# Patient Record
Sex: MALE | Race: WHITE | NOT HISPANIC OR LATINO
[De-identification: names, ages, dates, MRNs, and addresses within clinical notes are randomized per-mention and may not be internally consistent; named-entity substitution may affect disease eponyms.]

---

## 2018-02-24 ENCOUNTER — TRANSCRIPTION ENCOUNTER (OUTPATIENT)
Age: 53
End: 2018-02-24

## 2018-02-27 ENCOUNTER — TRANSCRIPTION ENCOUNTER (OUTPATIENT)
Age: 53
End: 2018-02-27

## 2020-10-20 ENCOUNTER — EMERGENCY (EMERGENCY)
Facility: HOSPITAL | Age: 55
LOS: 0 days | Discharge: HOME | End: 2020-10-20
Attending: EMERGENCY MEDICINE | Admitting: EMERGENCY MEDICINE
Payer: OTHER MISCELLANEOUS

## 2020-10-20 VITALS
HEART RATE: 65 BPM | SYSTOLIC BLOOD PRESSURE: 134 MMHG | OXYGEN SATURATION: 100 % | RESPIRATION RATE: 18 BRPM | DIASTOLIC BLOOD PRESSURE: 81 MMHG | HEIGHT: 74 IN | WEIGHT: 214.95 LBS | TEMPERATURE: 98 F
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
## 2020-10-20 DIAGNOSIS — S61.001A UNSPECIFIED OPEN WOUND OF RIGHT THUMB WITHOUT DAMAGE TO NAIL, INITIAL ENCOUNTER: ICD-10-CM

## 2020-10-20 DIAGNOSIS — Z23 ENCOUNTER FOR IMMUNIZATION: ICD-10-CM
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
## 2020-10-20 PROCEDURE — 64450 NJX AA&/STRD OTHER PN/BRANCH: CPT

## 2020-10-20 PROCEDURE — 99283 EMERGENCY DEPT VISIT LOW MDM: CPT | Mod: 25

## 2020-10-20 RX ORDER — TETANUS TOXOID, REDUCED DIPHTHERIA TOXOID AND ACELLULAR PERTUSSIS VACCINE, ADSORBED 5; 2.5; 8; 8; 2.5 [IU]/.5ML; [IU]/.5ML; UG/.5ML; UG/.5ML; UG/.5ML
0.5 SUSPENSION INTRAMUSCULAR ONCE
Refills: 0 | Status: COMPLETED | OUTPATIENT
Start: 2020-10-20 | End: 2020-10-20

## 2020-10-20 RX ADMIN — TETANUS TOXOID, REDUCED DIPHTHERIA TOXOID AND ACELLULAR PERTUSSIS VACCINE, ADSORBED 0.5 MILLILITER(S): 5; 2.5; 8; 8; 2.5 SUSPENSION INTRAMUSCULAR at 10:19

## 2020-10-20 NOTE — ED PROVIDER NOTE - NS ED ROS FT
Constitutional: No fevers.   Eyes:  No visual changes, eye pain or discharge.  ENMT:  No sore throat or runny nose.  MS:  no joint injury.   Neuro:  No numbness/tingling.   Skin:  avulsion to the left thumb.   Endocrine: No history of thyroid disease or diabetes.

## 2020-10-20 NOTE — ED PROVIDER NOTE - PROGRESS NOTE DETAILS
ATTENDING NOTE: I personally evaluated the patient. I reviewed the Resident’s or Physician Assistant’s note (as assigned above), and agree with the findings and plan except as documented in my note. 54 y/o M presents to the ED s/p cut to the tip of chis L thumb while opening a can. (Please note, triage note states R thumb). Tetanus UTD. On exam: Skin avulsion to tip of L thumb. Bleeding controlled with pressure. Hemostasis achieved with quick clot. Hemostasis controlled with TXA and hemostatic dressing. Return precautions discussed; signs of infection discussed as well. -DC ATTENDING NOTE: I personally evaluated the patient. I reviewed the Resident’s or Physician Assistant’s note (as assigned above), and agree with the findings and plan except as documented in my note. 56 y/o M presents to the ED s/p cut to the tip of his L thumb while opening a can. (Please note, triage note states R thumb). Tetanus UTD. On exam: Skin avulsion to tip of L thumb. Bleeding controlled with pressure. Hemostasis achieved with quick clot.

## 2020-10-20 NOTE — ED ADULT NURSE NOTE - NSIMPLEMENTINTERV_GEN_ALL_ED
Implemented All Universal Safety Interventions:  [place] to call system. Call bell, personal items and telephone within reach. Instruct patient to call for assistance. Room bathroom lighting operational. Non-slip footwear when patient is off stretcher. Physically safe environment: no spills, clutter or unnecessary equipment. Stretcher in lowest position, wheels locked, appropriate side rails in place.

## 2020-10-20 NOTE — ED PROVIDER NOTE - PHYSICAL EXAMINATION
CONSTITUTIONAL: Well-developed; well-nourished; in no acute distress.   SKIN: 1 cm avulsion of the lateral 1/4 of the pulp of the left thumb.   HEAD: Normocephalic; atraumatic.  NECK: Supple.  EXT: full range of motion of the left thumb.   NEURO: sensation intact to the left thumb.   PSYCH: Cooperative, appropriate.

## 2020-10-20 NOTE — ED PROVIDER NOTE - OBJECTIVE STATEMENT
Patient is a 54 yo M w/ no pmh, unknown last tetanus p/w avulsion of the pulp of the left thumb. Injury sustained while opening a can of Nevigo; after which, patient washed his finger under water. Bleeding not controlled initially. No numbness/tingling.

## 2020-10-20 NOTE — ED PROVIDER NOTE - PATIENT PORTAL LINK FT
You can access the FollowMyHealth Patient Portal offered by [facility] by registering at the following website: [email or web address]. By joining [employer]’s FollowMyHealth portal, you will also be able to view your health information using other applications (apps) compatible with our system.

## 2020-10-20 NOTE — ED PROCEDURE NOTE - PROCEDURE ADDITIONAL DETAILS
Patient with avulsion of lateral aspect of the pulp of the left thumb. TXA and hemostatic dressing applied with direct pressure; bleeding controlled.

## 2020-10-20 NOTE — ED PROVIDER NOTE - CARE PROVIDER_API CALL
Olegario White  ORTHOPAEDIC SURGERY  [phone] [place] [address]  Phone: [phone]  Fax: [phone]  Follow Up Time: Routine

## 2021-10-09 ENCOUNTER — TRANSCRIPTION ENCOUNTER (OUTPATIENT)
Age: 56
End: 2021-10-09

## 2021-12-13 ENCOUNTER — TRANSCRIPTION ENCOUNTER (OUTPATIENT)
Age: 56
End: 2021-12-13

## 2022-11-09 ENCOUNTER — APPOINTMENT (OUTPATIENT)
Dept: UROLOGY | Facility: CLINIC | Age: 57
End: 2022-11-09

## 2022-11-09 VITALS
OXYGEN SATURATION: 98 % | BODY MASS INDEX: 26.31 KG/M2 | RESPIRATION RATE: 16 BRPM | WEIGHT: 205 LBS | TEMPERATURE: 97.5 F | HEIGHT: 74 IN

## 2022-11-09 DIAGNOSIS — Z78.9 OTHER SPECIFIED HEALTH STATUS: ICD-10-CM

## 2022-11-09 DIAGNOSIS — Z80.9 FAMILY HISTORY OF MALIGNANT NEOPLASM, UNSPECIFIED: ICD-10-CM

## 2022-11-09 DIAGNOSIS — Z80.8 FAMILY HISTORY OF MALIGNANT NEOPLASM OF OTHER ORGANS OR SYSTEMS: ICD-10-CM

## 2022-11-09 DIAGNOSIS — Z82.1 FAMILY HISTORY OF BLINDNESS AND VISUAL LOSS: ICD-10-CM

## 2022-11-09 PROBLEM — Z00.00 ENCOUNTER FOR PREVENTIVE HEALTH EXAMINATION: Status: ACTIVE | Noted: 2022-11-09

## 2022-11-09 PROCEDURE — 99203 OFFICE O/P NEW LOW 30 MIN: CPT

## 2022-11-09 NOTE — HISTORY OF PRESENT ILLNESS
[FreeTextEntry1] : 57-year-old with lower urinary tract symptoms consisting of occasional sensation of incomplete emptying, occasional daytime urinary frequency, occasional intermittency, urgency about half the time, weak urinary stream most of the time and straining to urinate.  He has nocturia x4.  He is not significantly bothered by the symptoms.  Prostate symptom score 19 out of 35.  No recent PSA

## 2022-11-09 NOTE — PHYSICAL EXAM
[General Appearance - In No Acute Distress] : no acute distress [] : no respiratory distress [Prostate Tenderness] : the prostate was not tender [No Prostate Nodules] : no prostate nodules [Prostate Size ___ (0-4)] : prostate size [unfilled] (scale: 0-4) [Normal Station and Gait] : the gait and station were normal for the patient's age [Oriented To Time, Place, And Person] : oriented to person, place, and time

## 2022-11-09 NOTE — ASSESSMENT
[Urinary Symptom or Sign (788.99\R39.89)] : implantation [FreeTextEntry1] : BPH moderate lower urinary tract symptoms which are not bothersome to patient.  He does not want any medication or procedure at this time but agrees to getting PSA and a renal bladder ultrasound to check on emptying and check for hydronephrosis.

## 2022-12-05 LAB — PSA SERPL-MCNC: 0.39 NG/ML

## 2023-01-24 ENCOUNTER — APPOINTMENT (OUTPATIENT)
Dept: UROLOGY | Facility: CLINIC | Age: 58
End: 2023-01-24
Payer: COMMERCIAL

## 2023-01-24 PROCEDURE — 99214 OFFICE O/P EST MOD 30 MIN: CPT

## 2023-01-24 NOTE — END OF VISIT
[FreeTextEntry3] : Participated in obtaining the history, reviewed results, discussed treatment plan with patient agree with the above transcription by the physicians assistant

## 2023-01-24 NOTE — ASSESSMENT
[FreeTextEntry1] : 57-year-old with BPH and lower urinary tract symptoms.\par PSA 0.39 ng/mL.\par \par Patient is bothered by urinary symptoms and request treatment.  Discussed tamsulosin 0.4 mg daily, tadalafil 5 mg daily, and procedures.\par Patient would like to try tamsulosin 0.4 mg daily 30 minutes after dinner.  Side effects of tamsulosin reviewed in detail.\par \par Plan\par -Follow-up 2 to 3 months to assess urinary symptoms on tamsulosin 0.4 mg\par -PSA 1 year [Urinary Symptom or Sign (788.99\R39.89)] : implantation

## 2023-01-24 NOTE — HISTORY OF PRESENT ILLNESS
[FreeTextEntry1] : 57-year-old with lower urinary tract symptoms with chief complaint of occasional sensation of incomplete emptying and occasional daytime urinary frequency and straining to urinate.  Nocturia x4.  Prostate symptom score 19 out of 35.  PSA December 2022 0.39 ng/mL.  Ultrasound of kidney and bladder done January 11, 2023 reveals no hydronephrosis or shadowing renal stones.  Thickened bladder wall with a postvoid residual of 24.1 cm³.  Prostate size of 35.2 cm³.  Patient denies dysuria and gross hematuria.

## 2023-04-26 ENCOUNTER — APPOINTMENT (OUTPATIENT)
Dept: UROLOGY | Facility: CLINIC | Age: 58
End: 2023-04-26
Payer: COMMERCIAL

## 2023-04-26 VITALS
RESPIRATION RATE: 18 BRPM | BODY MASS INDEX: 26.31 KG/M2 | SYSTOLIC BLOOD PRESSURE: 131 MMHG | OXYGEN SATURATION: 98 % | TEMPERATURE: 97 F | WEIGHT: 205 LBS | HEIGHT: 74 IN | HEART RATE: 77 BPM | DIASTOLIC BLOOD PRESSURE: 84 MMHG

## 2023-04-26 DIAGNOSIS — N40.1 BENIGN PROSTATIC HYPERPLASIA WITH LOWER URINARY TRACT SYMPMS: ICD-10-CM

## 2023-04-26 DIAGNOSIS — R39.9 UNSPECIFIED SYMPTOMS AND SIGNS INVOLVING THE GENITOURINARY SYSTEM: ICD-10-CM

## 2023-04-26 DIAGNOSIS — N13.8 BENIGN PROSTATIC HYPERPLASIA WITH LOWER URINARY TRACT SYMPMS: ICD-10-CM

## 2023-04-26 PROCEDURE — 99214 OFFICE O/P EST MOD 30 MIN: CPT

## 2023-04-26 RX ORDER — TAMSULOSIN HYDROCHLORIDE 0.4 MG/1
0.4 CAPSULE ORAL DAILY
Qty: 90 | Refills: 3 | Status: COMPLETED | COMMUNITY
Start: 2023-01-24 | End: 2023-04-26

## 2023-04-26 RX ORDER — SILODOSIN 8 MG/1
8 CAPSULE ORAL
Qty: 90 | Refills: 3 | Status: ACTIVE | COMMUNITY
Start: 2023-04-26 | End: 1900-01-01

## 2023-04-26 NOTE — HISTORY OF PRESENT ILLNESS
[FreeTextEntry1] : Kalen is a 57-year-old male with lower urinary tract symptoms.  He has chief complaint of sensation of incomplete emptying and occasional daytime urinary frequency.  Nocturia x4.  Prostate symptom score 19 out of 35.  He was started on tamsulosin 0.4 mg daily and patient states that this has significantly improved his urinary symptoms.  Patient states he has been taking this medication every day for the last 3 months he has developed a facial rash.  Patient has not contacted office nor did he see another medical provider for this facial rash.  PSA in December 2022 was 0.39 ng/mL.\par \par Ultrasound of kidney and bladder done January 11, 2023 reveals no hydronephrosis or shadowing renal stones. Thickened bladder wall with a postvoid residual of 24.1 cm³. Prostate size of 35.2 cm³. Patient denies dysuria and gross hematuria. \par

## 2023-04-26 NOTE — END OF VISIT
[FreeTextEntry3] : I participated in obtaining the history, formulated a treatment plan which I discussed with the patient agree with the above transcription by the physicians assistant

## 2023-04-26 NOTE — ASSESSMENT
[FreeTextEntry1] : 57-year-old with BPH and lower urinary tract symptoms.\par PSA in December 2022 was 0.39 ng/mL.\par \par Urinary symptoms significantly improved on tamsulosin 0.4 mg daily.\par Patient has developed a facial rash since taking medication and has continued to take tamsulosin.\par \par Instructed patient to discontinue tamsulosin given development of facial rash. Patient states he has NKA. \par Will substitute with silodosin.  Side effects of silodosin reviewed.\par \par Patient instructed to follow-up with medical doctor regarding facial rash and patient verbalized understanding.  Patient states that he will also follow-up with a dermatologist.\par \par Patient to follow-up December 2023 with repeat PSA\par Follow-up sooner if urinary symptoms bothersome [Urinary Symptom or Sign (788.99\R39.89)] : implantation

## 2023-12-19 ENCOUNTER — APPOINTMENT (OUTPATIENT)
Dept: UROLOGY | Facility: CLINIC | Age: 58
End: 2023-12-19

## 2024-09-23 NOTE — ED PROVIDER NOTE - ATTENDING CONTRIBUTION TO CARE
right/yes I have personally evaluated this patient. I have seen this patient with a medical scribe, please see PROGRESS NOTE for my contribution to care. I have reviewed scribe notes which are accurate.